# Patient Record
Sex: FEMALE | Race: WHITE | NOT HISPANIC OR LATINO | Employment: STUDENT | URBAN - METROPOLITAN AREA
[De-identification: names, ages, dates, MRNs, and addresses within clinical notes are randomized per-mention and may not be internally consistent; named-entity substitution may affect disease eponyms.]

---
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
## 2024-03-14 ENCOUNTER — TELEPHONE (OUTPATIENT)
Dept: PEDIATRICS | Age: 19
End: 2024-03-14

## 2025-01-27 ENCOUNTER — OFFICE VISIT (OUTPATIENT)
Dept: OBGYN | Facility: CLINIC | Age: 20
End: 2025-01-27
Attending: ADVANCED PRACTICE MIDWIFE
Payer: COMMERCIAL

## 2025-01-27 VITALS — HEIGHT: 64 IN | HEART RATE: 116 BPM | SYSTOLIC BLOOD PRESSURE: 116 MMHG | DIASTOLIC BLOOD PRESSURE: 63 MMHG

## 2025-01-27 DIAGNOSIS — N89.8 VAGINAL ODOR: Primary | ICD-10-CM

## 2025-01-27 DIAGNOSIS — Z97.5 BREAKTHROUGH BLEEDING ON NEXPLANON: ICD-10-CM

## 2025-01-27 DIAGNOSIS — Z11.3 SCREENING EXAMINATION FOR VENEREAL DISEASE: ICD-10-CM

## 2025-01-27 DIAGNOSIS — N92.1 BREAKTHROUGH BLEEDING ON NEXPLANON: ICD-10-CM

## 2025-01-27 LAB
BACTERIAL VAGINOSIS VAG-IMP: NEGATIVE
CANDIDA DNA VAG QL NAA+PROBE: NOT DETECTED
CANDIDA GLABRATA / CANDIDA KRUSEI DNA: NOT DETECTED
T VAGINALIS DNA VAG QL NAA+PROBE: NOT DETECTED

## 2025-01-27 PROCEDURE — 87491 CHLMYD TRACH DNA AMP PROBE: CPT | Performed by: ADVANCED PRACTICE MIDWIFE

## 2025-01-27 PROCEDURE — 87591 N.GONORRHOEAE DNA AMP PROB: CPT | Performed by: ADVANCED PRACTICE MIDWIFE

## 2025-01-27 PROCEDURE — 81515 NFCT DS BV&VAGINITIS DNA ALG: CPT | Performed by: ADVANCED PRACTICE MIDWIFE

## 2025-01-27 RX ORDER — BUPROPION HYDROCHLORIDE 300 MG/1
300 TABLET ORAL
COMMUNITY
Start: 2024-05-15 | End: 2025-12-17

## 2025-01-27 RX ORDER — CLINDAMYCIN PHOSPHATE 10 MG/G
GEL TOPICAL DAILY
COMMUNITY
Start: 2024-05-16

## 2025-01-27 RX ORDER — LISDEXAMFETAMINE DIMESYLATE 50 MG/1
50 CAPSULE ORAL
COMMUNITY
Start: 2025-01-22 | End: 2025-02-21

## 2025-01-27 ASSESSMENT — ANXIETY QUESTIONNAIRES
8. IF YOU CHECKED OFF ANY PROBLEMS, HOW DIFFICULT HAVE THESE MADE IT FOR YOU TO DO YOUR WORK, TAKE CARE OF THINGS AT HOME, OR GET ALONG WITH OTHER PEOPLE?: NOT DIFFICULT AT ALL
7. FEELING AFRAID AS IF SOMETHING AWFUL MIGHT HAPPEN: NOT AT ALL
4. TROUBLE RELAXING: NOT AT ALL
5. BEING SO RESTLESS THAT IT IS HARD TO SIT STILL: NOT AT ALL
GAD7 TOTAL SCORE: 0
7. FEELING AFRAID AS IF SOMETHING AWFUL MIGHT HAPPEN: NOT AT ALL
6. BECOMING EASILY ANNOYED OR IRRITABLE: NOT AT ALL
2. NOT BEING ABLE TO STOP OR CONTROL WORRYING: NOT AT ALL
IF YOU CHECKED OFF ANY PROBLEMS ON THIS QUESTIONNAIRE, HOW DIFFICULT HAVE THESE PROBLEMS MADE IT FOR YOU TO DO YOUR WORK, TAKE CARE OF THINGS AT HOME, OR GET ALONG WITH OTHER PEOPLE: NOT DIFFICULT AT ALL
1. FEELING NERVOUS, ANXIOUS, OR ON EDGE: NOT AT ALL
GAD7 TOTAL SCORE: 0
3. WORRYING TOO MUCH ABOUT DIFFERENT THINGS: NOT AT ALL
GAD7 TOTAL SCORE: 0

## 2025-01-27 NOTE — PROGRESS NOTES
"Subjective:  Destiney Marroquin 19 year old year old female, No obstetric history on file., who presents with complaints of local irritation of vaginal walls and odor.  Onset of symptoms 1 days ago, gradually worsening.     Predisposing factors - Reports placing a tampon last Friday that she forgot to remove.  Had IC that evening, no barrier method used.  Saturday morning she woke up bleeding and put another tampon in not knowing she forgot to remove the one placed Friday.  On Sunday, after removing Saturday tampon, she reports new onset vaginal odor.  This morning she found the tampon that she placed on Friday lodged deep in her vagina.  She is concerned that there still might be \"pieces\" of the tampon left behind.  She denies fever, chills, pelvic pain, vaginal itching or discharge, or urinary symptoms.      2 sexual partners over the past year.  No known exposures.  Nexplanon for contraception.  Would like swab for STIs today..  Hx of Clamydia in 2022.    Complains of irregular, unscheduled bleeding with Nexplanon.  Has been happening regularly since placement in 2022.  This past month she has had period since 1/11 and is still bleeding today.  Reports frustration with this bleeding.  Has tried OCPs in the past, but is \"forgetful\" and has trouble remembering to take pills.  Is curious about IUD, but has too many friends with \"bad experiences\".    Objective:   /63   Pulse (!) 116   Ht 1.626 m (5' 4\")   LMP 01/11/2025 (Exact Date)     She appears well    Pelvic Exam:  EG/BUS: Normal genitalia and Bartholin's, Urethra, [place]'s normal    Vagina: moist, pink, rugae with blood tinged secretions.  No evidence of tampon material  Cervix: no lesions and pink, moist, closed, without lesion or CMT  Uterus:anteverted,    Adnexa:Within normal limits and No masses, nodularity, tenderness  Rectum:anus normal    Assessment/Plan:  1. Vaginal odor (Primary)  No evidence of retained tampon material on exam today.  Pelvic " exam WNL today.  Pt reports odor.  - Multiplex Vaginal Panel by PCR    2. Screening examination for venereal disease  Complete baseline testing today.  Discussed the need to RTC in 2-3 weeks for repeat testing due to most recent IC 3 days ago.  Standing order placed.  - Chlamydia trachomatis PCR; Standing  - Neisseria gonorrhoea PCR; Standing  - Chlamydia trachomatis PCR  - Neisseria gonorrhoea PCR    3. Breakthrough bleeding on Nexplanon  Discussed a trial dose of ibuprofen 6-800mg q8hrs for 7-10days to see if bleeding improves.  Could also consider a trial dose of OCPs for 1-3 months in future.  Pt requested more info re: Paragaurd to review.  Potential pain management strategies for IUD insertion discussed briefly.        Return to clinic prn if symptoms persist or worsen.     I, Usha Jovel, completed the PFSH and ROS. I then acted as a scribe for Usha Jovel CNM, for the remainder of the visit.  Usha FOWLER DNP Student

## 2025-01-27 NOTE — PATIENT INSTRUCTIONS
Thank you for trusting us with your care!   Please be aware, if you are on Mychart, you may see your results prior to your providers review. If labs are abnormal, we will call or message you on AppCastt with a follow up plan.    If you need to contact us for questions about:  Symptoms, Scheduling & Medical Questions; Non-urgent (2-3 day response) [employer] message, Urgent (needing response today) [phone] (if after 3:30pm next day response)   Prescriptions: Please call your Pharmacy   Billing: [facility] [phone] or [facility]:[phone]

## 2025-01-27 NOTE — LETTER
"1/27/2025       RE: Destiney Marroquin  [id number] [place] [address]     Dear Colleague,    Thank you for referring your patient, Destiney Marroquin, to the [facility] [place] at [facility]. Please see a copy of my visit note below.    Subjective:  Destiney Marroquin 19 year old year old female, No obstetric history on file., who presents with complaints of local irritation of vaginal walls and odor.  Onset of symptoms 1 days ago, gradually worsening.     Predisposing factors - Reports placing a tampon last Friday that she forgot to remove.  Had IC that evening, no barrier method used.  Saturday morning she woke up bleeding and put another tampon in not knowing she forgot to remove the one placed Friday.  On Sunday, after removing Saturday tampon, she reports new onset vaginal odor.  This morning she found the tampon that she placed on Friday lodged deep in her vagina.  She is concerned that there still might be \"pieces\" of the tampon left behind.  She denies fever, chills, pelvic pain, vaginal itching or discharge, or urinary symptoms.      2 sexual partners over the past year.  No known exposures.  Nexplanon for contraception.  Would like swab for STIs today..  Hx of Clamydia in 2022.    Complains of irregular, unscheduled bleeding with Nexplanon.  Has been happening regularly since placement in 2022.  This past month she has had period since 1/11 and is still bleeding today.  Reports frustration with this bleeding.  Has tried OCPs in the past, but is \"forgetful\" and has trouble remembering to take pills.  Is curious about IUD, but has too many friends with \"bad experiences\".    Objective:   /63   Pulse (!) 116   Ht 1.626 m (5' 4\")   LMP 01/11/2025 (Exact Date)     She appears well    Pelvic Exam:  EG/BUS: Normal genitalia and Bartholin's, Urethra, [place]'s normal    Vagina: moist, pink, rugae with blood tinged secretions. "  No evidence of tampon material  Cervix: no lesions and pink, moist, closed, without lesion or CMT  Uterus:anteverted,    Adnexa:Within normal limits and No masses, nodularity, tenderness  Rectum:anus normal    Assessment/Plan:  1. Vaginal odor (Primary)  No evidence of retained tampon material on exam today.  Pelvic exam WNL today.  Pt reports odor.  - Multiplex Vaginal Panel by PCR    2. Screening examination for venereal disease  Complete baseline testing today.  Discussed the need to RTC in 2-3 weeks for repeat testing due to most recent IC 3 days ago.  Standing order placed.  - Chlamydia trachomatis PCR; Standing  - Neisseria gonorrhoea PCR; Standing  - Chlamydia trachomatis PCR  - Neisseria gonorrhoea PCR    3. Breakthrough bleeding on Nexplanon  Discussed a trial dose of ibuprofen 6-800mg q8hrs for 7-10days to see if bleeding improves.  Could also consider a trial dose of OCPs for 1-3 months in future.  Pt requested more info re: Paragaurd to review.  Potential pain management strategies for IUD insertion discussed briefly.        Return to clinic prn if symptoms persist or worsen.     I, Usha Jovel, completed the PFSH and ROS. I then acted as a scribe for Usha Jovel CNM, for the remainder of the visit.  Usha DICKERSONNP DNP Student         Again, thank you for allowing me to participate in the care of your patient.      Sincerely,    Soraya Blackburn CNM

## 2025-01-28 LAB
C TRACH DNA SPEC QL NAA+PROBE: POSITIVE
N GONORRHOEA DNA SPEC QL NAA+PROBE: NEGATIVE
SPECIMEN TYPE: ABNORMAL
SPECIMEN TYPE: NORMAL

## 2025-01-30 ENCOUNTER — TELEPHONE (OUTPATIENT)
Dept: OBGYN | Facility: CLINIC | Age: 20
End: 2025-01-30
Payer: COMMERCIAL

## 2025-01-30 DIAGNOSIS — A74.9 CHLAMYDIA: Primary | ICD-10-CM

## 2025-01-30 RX ORDER — DOXYCYCLINE 100 MG/1
100 CAPSULE ORAL 2 TIMES DAILY
Qty: 14 CAPSULE | Refills: 1 | Status: SHIPPED | OUTPATIENT
Start: 2025-01-30 | End: 2025-02-06

## 2025-01-30 NOTE — TELEPHONE ENCOUNTER
"Received \"Sexually Transmitted Disease Laboratory & Morbidity Epidemiologic Case Report\"    Placed form in Tomasa's mailbox for the midwives to review  "

## 2025-02-16 ENCOUNTER — HEALTH MAINTENANCE LETTER (OUTPATIENT)
Age: 20
End: 2025-02-16